# Patient Record
Sex: FEMALE | Race: BLACK OR AFRICAN AMERICAN | Employment: OTHER
[De-identification: names, ages, dates, MRNs, and addresses within clinical notes are randomized per-mention and may not be internally consistent; named-entity substitution may affect disease eponyms.]

---

## 2022-03-20 PROBLEM — R07.9 CHEST PAIN: Status: ACTIVE | Noted: 2017-09-20

## 2023-01-31 RX ORDER — AMLODIPINE BESYLATE 10 MG/1
10 TABLET ORAL DAILY
COMMUNITY

## 2023-01-31 RX ORDER — ALBUTEROL SULFATE 2.5 MG/3ML
2.5 SOLUTION RESPIRATORY (INHALATION) PRN
COMMUNITY

## 2023-01-31 RX ORDER — PAROXETINE HYDROCHLORIDE 20 MG/1
20 TABLET, FILM COATED ORAL DAILY
COMMUNITY

## 2023-01-31 RX ORDER — METHOCARBAMOL 750 MG/1
750 TABLET, FILM COATED ORAL 4 TIMES DAILY
COMMUNITY
Start: 2018-03-12

## 2023-01-31 RX ORDER — LISINOPRIL 10 MG/1
40 TABLET ORAL DAILY
COMMUNITY

## 2023-01-31 RX ORDER — RITONAVIR 100 MG/1
100 TABLET ORAL DAILY
COMMUNITY

## 2023-01-31 RX ORDER — OMEPRAZOLE 40 MG/1
40 CAPSULE, DELAYED RELEASE ORAL DAILY
COMMUNITY

## 2023-01-31 RX ORDER — ATAZANAVIR 300 MG/1
300 CAPSULE ORAL DAILY
COMMUNITY

## 2023-01-31 RX ORDER — ATROPINE SULFATE 10 MG/ML
1 SOLUTION/ DROPS OPHTHALMIC DAILY
COMMUNITY

## 2023-03-20 ENCOUNTER — HOSPITAL ENCOUNTER (EMERGENCY)
Facility: HOSPITAL | Age: 68
Discharge: HOME | End: 2023-03-21
Attending: EMERGENCY MEDICINE
Payer: MEDICARE

## 2023-03-20 ENCOUNTER — APPOINTMENT (OUTPATIENT)
Facility: HOSPITAL | Age: 68
End: 2023-03-20
Payer: MEDICARE

## 2023-03-20 DIAGNOSIS — M54.50 ACUTE LOW BACK PAIN, UNSPECIFIED BACK PAIN LATERALITY, UNSPECIFIED WHETHER SCIATICA PRESENT: ICD-10-CM

## 2023-03-20 DIAGNOSIS — J90 PLEURAL EFFUSION: Primary | ICD-10-CM

## 2023-03-20 LAB
ALBUMIN SERPL-MCNC: 1.9 G/DL (ref 3.4–5)
ALBUMIN/GLOB SERPL: 0.2 (ref 0.8–1.7)
ALP SERPL-CCNC: 167 U/L (ref 45–117)
ALT SERPL-CCNC: 35 U/L (ref 13–56)
ANION GAP SERPL CALC-SCNC: 1 MMOL/L (ref 3–18)
APPEARANCE UR: CLEAR
AST SERPL-CCNC: 88 U/L (ref 10–38)
BASOPHILS # BLD: 0 K/UL (ref 0–0.1)
BASOPHILS NFR BLD: 0 % (ref 0–2)
BILIRUB SERPL-MCNC: 1.3 MG/DL (ref 0.2–1)
BILIRUB UR QL: NEGATIVE
BUN SERPL-MCNC: 11 MG/DL (ref 7–18)
BUN/CREAT SERPL: 12 (ref 12–20)
CALCIUM SERPL-MCNC: 8.3 MG/DL (ref 8.5–10.1)
CHLORIDE SERPL-SCNC: 110 MMOL/L (ref 100–111)
CO2 SERPL-SCNC: 24 MMOL/L (ref 21–32)
COLOR UR: YELLOW
CREAT SERPL-MCNC: 0.94 MG/DL (ref 0.6–1.3)
DIFFERENTIAL METHOD BLD: ABNORMAL
EOSINOPHIL # BLD: 0 K/UL (ref 0–0.4)
EOSINOPHIL NFR BLD: 2 % (ref 0–5)
EPITH CASTS URNS QL MICRO: NORMAL /LPF (ref 0–5)
ERYTHROCYTE [DISTWIDTH] IN BLOOD BY AUTOMATED COUNT: 18.1 % (ref 11.6–14.5)
GLOBULIN SER CALC-MCNC: 8 G/DL (ref 2–4)
GLUCOSE SERPL-MCNC: 106 MG/DL (ref 74–99)
GLUCOSE UR STRIP.AUTO-MCNC: NEGATIVE MG/DL
HCT VFR BLD AUTO: 30.7 % (ref 35–45)
HGB BLD-MCNC: 9.9 G/DL (ref 12–16)
HGB UR QL STRIP: ABNORMAL
IMM GRANULOCYTES # BLD AUTO: 0 K/UL
IMM GRANULOCYTES NFR BLD AUTO: 0 %
KETONES UR QL STRIP.AUTO: NEGATIVE MG/DL
LEUKOCYTE ESTERASE UR QL STRIP.AUTO: NEGATIVE
LYMPHOCYTES # BLD: 1.6 K/UL (ref 0.9–3.6)
LYMPHOCYTES NFR BLD: 65 % (ref 21–52)
MAGNESIUM SERPL-MCNC: 1.8 MG/DL (ref 1.6–2.6)
MCH RBC QN AUTO: 28.5 PG (ref 24–34)
MCHC RBC AUTO-ENTMCNC: 32.2 G/DL (ref 31–37)
MCV RBC AUTO: 88.5 FL (ref 78–100)
MONOCYTES # BLD: 0.1 K/UL (ref 0.05–1.2)
MONOCYTES NFR BLD: 5 % (ref 3–10)
NEUTS SEG # BLD: 0.7 K/UL (ref 1.8–8)
NEUTS SEG NFR BLD: 28 % (ref 40–73)
NITRITE UR QL STRIP.AUTO: NEGATIVE
NRBC # BLD: 0 K/UL (ref 0–0.01)
NRBC BLD-RTO: 0 PER 100 WBC
PH UR STRIP: 7 (ref 5–8)
PLATELET # BLD AUTO: 59 K/UL (ref 135–420)
PLATELET COMMENT: ABNORMAL
PMV BLD AUTO: 13.2 FL (ref 9.2–11.8)
POTASSIUM SERPL-SCNC: 4.5 MMOL/L (ref 3.5–5.5)
PROT SERPL-MCNC: 9.9 G/DL (ref 6.4–8.2)
PROT UR STRIP-MCNC: NEGATIVE MG/DL
RBC # BLD AUTO: 3.47 M/UL (ref 4.2–5.3)
RBC #/AREA URNS HPF: NORMAL /HPF (ref 0–5)
RBC MORPH BLD: ABNORMAL
SODIUM SERPL-SCNC: 135 MMOL/L (ref 136–145)
SP GR UR REFRACTOMETRY: 1.02 (ref 1–1.03)
TROPONIN I SERPL HS-MCNC: 85 NG/L (ref 0–54)
TROPONIN I SERPL HS-MCNC: 86 NG/L (ref 0–54)
UROBILINOGEN UR QL STRIP.AUTO: 2 EU/DL (ref 0.2–1)
WBC # BLD AUTO: 2.4 K/UL (ref 4.6–13.2)

## 2023-03-20 PROCEDURE — 6360000004 HC RX CONTRAST MEDICATION: Performed by: EMERGENCY MEDICINE

## 2023-03-20 PROCEDURE — 96375 TX/PRO/DX INJ NEW DRUG ADDON: CPT

## 2023-03-20 PROCEDURE — 71275 CT ANGIOGRAPHY CHEST: CPT

## 2023-03-20 PROCEDURE — 93005 ELECTROCARDIOGRAM TRACING: CPT | Performed by: EMERGENCY MEDICINE

## 2023-03-20 PROCEDURE — 6360000002 HC RX W HCPCS: Performed by: EMERGENCY MEDICINE

## 2023-03-20 PROCEDURE — 96376 TX/PRO/DX INJ SAME DRUG ADON: CPT

## 2023-03-20 PROCEDURE — 99285 EMERGENCY DEPT VISIT HI MDM: CPT

## 2023-03-20 PROCEDURE — 81001 URINALYSIS AUTO W/SCOPE: CPT

## 2023-03-20 PROCEDURE — 96374 THER/PROPH/DIAG INJ IV PUSH: CPT

## 2023-03-20 PROCEDURE — 80053 COMPREHEN METABOLIC PANEL: CPT

## 2023-03-20 PROCEDURE — 84484 ASSAY OF TROPONIN QUANT: CPT

## 2023-03-20 PROCEDURE — 6360000002 HC RX W HCPCS: Performed by: STUDENT IN AN ORGANIZED HEALTH CARE EDUCATION/TRAINING PROGRAM

## 2023-03-20 PROCEDURE — 85025 COMPLETE CBC W/AUTO DIFF WBC: CPT

## 2023-03-20 PROCEDURE — 83735 ASSAY OF MAGNESIUM: CPT

## 2023-03-20 PROCEDURE — 2580000003 HC RX 258: Performed by: STUDENT IN AN ORGANIZED HEALTH CARE EDUCATION/TRAINING PROGRAM

## 2023-03-20 RX ORDER — MORPHINE SULFATE 4 MG/ML
4 INJECTION, SOLUTION INTRAMUSCULAR; INTRAVENOUS
Status: COMPLETED | OUTPATIENT
Start: 2023-03-20 | End: 2023-03-20

## 2023-03-20 RX ORDER — LEVOFLOXACIN 500 MG/1
500 TABLET, FILM COATED ORAL DAILY
Qty: 10 TABLET | Refills: 0 | Status: SHIPPED | OUTPATIENT
Start: 2023-03-20 | End: 2023-03-30

## 2023-03-20 RX ADMIN — CEFTRIAXONE 1000 MG: 1 INJECTION, POWDER, FOR SOLUTION INTRAMUSCULAR; INTRAVENOUS at 19:28

## 2023-03-20 RX ADMIN — IOPAMIDOL 80 ML: 755 INJECTION, SOLUTION INTRAVENOUS at 15:43

## 2023-03-20 RX ADMIN — AZITHROMYCIN MONOHYDRATE 500 MG: 500 INJECTION, POWDER, LYOPHILIZED, FOR SOLUTION INTRAVENOUS at 19:30

## 2023-03-20 RX ADMIN — MORPHINE SULFATE 4 MG: 4 INJECTION, SOLUTION INTRAMUSCULAR; INTRAVENOUS at 15:03

## 2023-03-20 ASSESSMENT — PAIN SCALES - GENERAL
PAINLEVEL_OUTOF10: 10
PAINLEVEL_OUTOF10: 10
PAINLEVEL_OUTOF10: 3

## 2023-03-20 ASSESSMENT — LIFESTYLE VARIABLES
HOW OFTEN DO YOU HAVE A DRINK CONTAINING ALCOHOL: NEVER
HOW MANY STANDARD DRINKS CONTAINING ALCOHOL DO YOU HAVE ON A TYPICAL DAY: PATIENT DOES NOT DRINK

## 2023-03-20 ASSESSMENT — PAIN DESCRIPTION - DESCRIPTORS: DESCRIPTORS: ACHING

## 2023-03-20 ASSESSMENT — PAIN - FUNCTIONAL ASSESSMENT: PAIN_FUNCTIONAL_ASSESSMENT: 0-10

## 2023-03-20 ASSESSMENT — PAIN DESCRIPTION - LOCATION: LOCATION: BUTTOCKS

## 2023-03-20 ASSESSMENT — PAIN DESCRIPTION - ORIENTATION: ORIENTATION: LEFT

## 2023-03-20 NOTE — ED NOTES
Pt medicated per MAR.    20g PIV flushed and antibiotics are infusing without difficulty.          Gabriel Santiago RN  03/20/23 0156

## 2023-03-20 NOTE — ED NOTES
Patient trial oxygen wean per Dr. Brie Barclay. Patient walked from room to bathroom on room air sats 79%. Patient returned to room and Oxygen reapplied at 3L sats up to 98%. Patient states that she has an Oxygen concentrated back home in [place], [place]. She states that she traveled here on March 4th and has not had any since she has been in [place]. Dr. Brie Barclay aware of findings.       Radha Wright RN  03/20/23 7285

## 2023-03-20 NOTE — ED NOTES
Bedside and Verbal shift change report given to [address] [place] [address] by [employer]. Report included the following information ED SBAR and MAR.         Akbar Joseph RN  03/20/23 9931

## 2023-03-20 NOTE — ED NOTES
Assisted patient to ambulate to restroom. Return to bed.  Resting quietly     Genette Kyle, [place]  03/20/23 2764

## 2023-03-20 NOTE — ED PROVIDER NOTES
management. REASSESSMENT     ED Course as of 03/20/23 1901   Mon Mar 20, 2023   1354 Troponin, High Sensitivity(!): 80 [MI]      ED Course User Index  [MI] Marylou Palomino MD         CRITICAL CARE TIME   Total Critical Care time was *** minutes, excluding separately reportable procedures. There was a high probability of clinically significant/life threatening deterioration in the patient's condition which required my urgent intervention. ***    CONSULTS:  None    PROCEDURES:  Unless otherwise noted below, none     Procedures    No LOS Charge filed ***    FINAL IMPRESSION      1. Pleural effusion    2. Acute low back pain, unspecified back pain laterality, unspecified whether sciatica present          DISPOSITION/PLAN   DISPOSITION Decision To Discharge 03/20/2023 06:10:39 PM      PATIENT REFERRED TO:  Doug Beck, [address] [place] [address]  [phone]    Call today      DISCHARGE MEDICATIONS:  New Prescriptions    LEVOFLOXACIN (LEVAQUIN) 500 MG TABLET    Take 1 tablet by mouth daily for 10 days     Controlled Substances Monitoring:     No flowsheet data found. (Please note that portions of this note were completed with a voice recognition program.  Efforts were made to edit the dictations but occasionally words are mis-transcribed. )    Sangita Machado MD (electronically signed)  Attending Emergency Physician

## 2023-03-21 VITALS
DIASTOLIC BLOOD PRESSURE: 81 MMHG | SYSTOLIC BLOOD PRESSURE: 165 MMHG | WEIGHT: 145 LBS | RESPIRATION RATE: 20 BRPM | TEMPERATURE: 98.5 F | HEART RATE: 61 BPM | BODY MASS INDEX: 21.98 KG/M2 | OXYGEN SATURATION: 96 % | HEIGHT: 68 IN

## 2023-03-21 LAB
EKG ATRIAL RATE: 57 BPM
EKG DIAGNOSIS: NORMAL
EKG P AXIS: 65 DEGREES
EKG P-R INTERVAL: 260 MS
EKG Q-T INTERVAL: 470 MS
EKG QRS DURATION: 68 MS
EKG QTC CALCULATION (BAZETT): 457 MS
EKG R AXIS: -20 DEGREES
EKG T AXIS: -1 DEGREES
EKG VENTRICULAR RATE: 57 BPM

## 2023-03-21 PROCEDURE — 93010 ELECTROCARDIOGRAM REPORT: CPT | Performed by: INTERNAL MEDICINE

## 2023-03-21 PROCEDURE — 6370000000 HC RX 637 (ALT 250 FOR IP): Performed by: EMERGENCY MEDICINE

## 2023-03-21 PROCEDURE — 6360000002 HC RX W HCPCS: Performed by: STUDENT IN AN ORGANIZED HEALTH CARE EDUCATION/TRAINING PROGRAM

## 2023-03-21 RX ORDER — ACETAMINOPHEN 325 MG/1
650 TABLET ORAL
Status: COMPLETED | OUTPATIENT
Start: 2023-03-21 | End: 2023-03-21

## 2023-03-21 RX ORDER — KETOROLAC TROMETHAMINE 15 MG/ML
15 INJECTION, SOLUTION INTRAMUSCULAR; INTRAVENOUS ONCE
Status: COMPLETED | OUTPATIENT
Start: 2023-03-21 | End: 2023-03-21

## 2023-03-21 RX ORDER — AMLODIPINE BESYLATE 10 MG/1
10 TABLET ORAL
Status: COMPLETED | OUTPATIENT
Start: 2023-03-21 | End: 2023-03-21

## 2023-03-21 RX ORDER — MORPHINE SULFATE 2 MG/ML
2 INJECTION, SOLUTION INTRAMUSCULAR; INTRAVENOUS
Status: COMPLETED | OUTPATIENT
Start: 2023-03-21 | End: 2023-03-21

## 2023-03-21 RX ADMIN — ACETAMINOPHEN 650 MG: 325 TABLET ORAL at 12:21

## 2023-03-21 RX ADMIN — KETOROLAC TROMETHAMINE 15 MG: 15 INJECTION, SOLUTION INTRAMUSCULAR; INTRAVENOUS at 02:29

## 2023-03-21 RX ADMIN — AMLODIPINE BESYLATE 10 MG: 10 TABLET ORAL at 07:50

## 2023-03-21 RX ADMIN — MORPHINE SULFATE 2 MG: 2 INJECTION, SOLUTION INTRAMUSCULAR; INTRAVENOUS at 03:53

## 2023-03-21 ASSESSMENT — PAIN DESCRIPTION - ORIENTATION
ORIENTATION: LEFT
ORIENTATION: LEFT

## 2023-03-21 ASSESSMENT — PAIN SCALES - GENERAL
PAINLEVEL_OUTOF10: 10

## 2023-03-21 ASSESSMENT — PAIN DESCRIPTION - LOCATION
LOCATION: HIP
LOCATION: BACK

## 2023-03-21 ASSESSMENT — PAIN DESCRIPTION - DESCRIPTORS: DESCRIPTORS: ACHING

## 2023-03-21 NOTE — ED NOTES
Follow up call placed to [address] [facility] [address]. Orders have been received and [facility] will deliver portable oxygen to ED, ETA unknown at this time.       Ashlee Martinez RN  03/21/23 3061

## 2023-03-21 NOTE — ED NOTES
Patient decided to sign out AMA. AMA form was signed and placed in the scan pile for scanning. Patient was informed of her decision of signing out AMA and she will have worsening of symptoms up to and including death. Patient verbalized understanding of these self care instructions.      Buster Sorensen RN  03/21/23 0679

## 2023-03-21 NOTE — ED NOTES
Pt given ice chips, ginger ale and a TV dinner at this time.        Tamy Mejia, SAULO  03/20/23 4785

## 2023-03-21 NOTE — ED NOTES
Bedside and Verbal shift change report given to [employer] by Adilene Encarnacion RN. Report included the following information ED SBAR and MAR. Oncoming RN made aware of BP's trending up, but also aware that pt has been noncompliant. Care transferred at this time.      Shahnaz Johns RN  03/21/23 8701

## 2023-03-21 NOTE — ED NOTES
Spoke with [facility]  regarding obtaining oxygen for Patient. Patient made aware and agreed that CM can contact her via cell phone ([phone]) to answer additional questions.      Farhana Valenzuela RN  03/21/23 184 Leander Pickens RN  03/21/23 1007

## 2023-03-21 NOTE — CARE COORDINATION
primary and Managed Med[employer] (MICH). E)  Patient states she has all 3 of her inhalers with her here in [address] received from patient to call and speak to daughter. CM called daughter Aide Manning ([phone]) , confirmed all details as relayed by patient. She is in the [employer] here, patient has been staying at her home, & will be dc there and she will be able to pick her up for transport Home. Discussed possible need to private pay for oxygen and will get estimate. She instructed CM to call patient son Alonso Hanna ([phone]) if $$ is needed as he does patient finances. Call placed 11:30 to [facility] [place] [facility] [place] Kaylee Cloud to discuss this issue. He was unavailable and requested call straight to office. Main office called x 3-4 with no answer after lengthy hold each time. 12:51 received call back from [facility] [place] [facility] [place] Kaylee Cloud. Discussed current patient situation. Patient cannot be double billed with Medicare/MICH so it will be either 1-  private pay of $175 for 1 week of O2 (pt flies out on 3/28). Or 2-  Cost absorbed by  [facility]. CM called son Alka Hawkins, he states he is unsure if he has those funds at this time. When CM called patient directly in the ED, she stated she can't afford it. DANIKA spoke with ED physician Dr Werner to request script for oxygen. All requested information including script sent to [employer] at [facility] [place]-  awaiting time for delivery to patient.       Regan Garcia RN [facility]   Interim Director  Dept

## 2023-04-17 ASSESSMENT — ENCOUNTER SYMPTOMS
GASTROINTESTINAL NEGATIVE: 1
SHORTNESS OF BREATH: 1